# Patient Record
Sex: FEMALE | Race: WHITE | NOT HISPANIC OR LATINO | Employment: UNEMPLOYED | URBAN - NONMETROPOLITAN AREA
[De-identification: names, ages, dates, MRNs, and addresses within clinical notes are randomized per-mention and may not be internally consistent; named-entity substitution may affect disease eponyms.]

---
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
## 2024-06-28 ENCOUNTER — HOSPITAL ENCOUNTER (EMERGENCY)
Facility: HOSPITAL | Age: 19
Discharge: HOME | End: 2024-06-28
Attending: EMERGENCY MEDICINE | Admitting: EMERGENCY MEDICINE
Payer: COMMERCIAL

## 2024-06-28 VITALS
HEIGHT: 63 IN | WEIGHT: 103 LBS | OXYGEN SATURATION: 99 % | RESPIRATION RATE: 16 BRPM | HEART RATE: 87 BPM | TEMPERATURE: 97.8 F | SYSTOLIC BLOOD PRESSURE: 139 MMHG | BODY MASS INDEX: 18.25 KG/M2 | DIASTOLIC BLOOD PRESSURE: 84 MMHG

## 2024-06-28 DIAGNOSIS — R21 RASH AND NONSPECIFIC SKIN ERUPTION: Primary | ICD-10-CM

## 2024-06-28 PROCEDURE — 99282 EMERGENCY DEPT VISIT SF MDM: CPT

## 2024-06-28 PROCEDURE — 99284 EMERGENCY DEPT VISIT MOD MDM: CPT | Performed by: EMERGENCY MEDICINE

## 2024-06-28 RX ORDER — PREDNISONE 20 MG/1
20 TABLET ORAL DAILY
Qty: 5 TABLET | Refills: 0 | Status: SHIPPED | OUTPATIENT
Start: 2024-06-28 | End: 2024-06-29

## 2024-06-28 RX ORDER — PREDNISONE 20 MG/1
20 TABLET ORAL ONCE
Status: COMPLETED | OUTPATIENT
Start: 2024-06-28 | End: 2024-06-28

## 2024-06-28 RX ADMIN — PREDNISONE 20 MG: 20 TABLET ORAL at 21:46

## 2024-06-28 NOTE — Clinical Note
Misti Vogel was seen and treated in our emergency department on 6/28/2024.                Diagnosis:     Misti  may return to work on return date.    She may return on this date: 06/30/2024         If you have any questions or concerns, please don't hesitate to call.      Reji Roberts MD    ______________________________           _______________          _______________  Hospital Representative                              Date                                Time

## 2024-06-29 RX ORDER — PREDNISONE 20 MG/1
20 TABLET ORAL DAILY
Qty: 5 TABLET | Refills: 0 | Status: SHIPPED | OUTPATIENT
Start: 2024-06-29 | End: 2024-07-04

## 2024-06-29 NOTE — ED PROVIDER NOTES
History  Chief Complaint   Patient presents with    Rash     Pt has had a rash for the last three days. States the rash is on her b/l legs and feet and b/l hands. States it burns and itches and gets worse at night.        History provided by:  Medical records and patient  Rash  Location:  Leg  Leg rash location:  L knee, R knee, L foot and R foot  Quality: itchiness and redness    Severity:  Mild  Onset quality:  Gradual  Duration:  3 days  Timing:  Intermittent  Progression:  Waxing and waning  Chronicity:  New  Context comment:  Patient has developed an itchy rash to the bilateral lower extremities.  No new exposures.  Relieved by:  Antihistamines (Patient states she takes been Benadryl, she goes to sleep, when she awakes the rash is gone)  Worsened by:  Nothing  Associated symptoms: nausea and vomiting    Associated symptoms: no abdominal pain, no diarrhea, no fatigue, no fever, no headaches, no hoarse voice, no induration, no joint pain, no myalgias, no periorbital edema, no shortness of breath, no sore throat, no throat swelling, no tongue swelling, no URI and not wheezing        Prior to Admission Medications   Prescriptions Last Dose Informant Patient Reported? Taking?   busPIRone (BUSPAR) 15 mg tablet   Yes No   Sig: Take 1 tablet by mouth 2 (two) times a day   citalopram (CeleXA) 40 mg tablet   Yes No   Sig: Take 40 mg by mouth daily   omeprazole (PriLOSEC) 40 MG capsule   Yes No   Sig: Take 40 mg by mouth daily      Facility-Administered Medications: None       Past Medical History:   Diagnosis Date    Anxiety        Past Surgical History:   Procedure Laterality Date    CHOLECYSTECTOMY      TONSILLECTOMY         History reviewed. No pertinent family history.  I have reviewed and agree with the history as documented.    E-Cigarette/Vaping    E-Cigarette Use Never User      E-Cigarette/Vaping Substances     Social History     Tobacco Use    Smoking status: Never    Smokeless tobacco: Never   Vaping Use     Vaping status: Never Used   Substance Use Topics    Alcohol use: Never    Drug use: Never       Review of Systems   Constitutional:  Negative for chills, fatigue and fever.   HENT:  Negative for ear discharge, ear pain, hoarse voice, rhinorrhea and sore throat.    Eyes:  Negative for pain and visual disturbance.   Respiratory:  Negative for cough, shortness of breath and wheezing.    Cardiovascular:  Negative for chest pain and palpitations.   Gastrointestinal:  Positive for nausea and vomiting. Negative for abdominal pain and diarrhea.        The patient states her boyfriend has a GI bug, she has similar symptoms, slowly improving.  The patient also states she does medical marijuana.   Endocrine: Negative for polydipsia, polyphagia and polyuria.   Genitourinary:  Negative for difficulty urinating, dysuria, flank pain and hematuria.   Musculoskeletal:  Negative for arthralgias, back pain and myalgias.   Skin:  Positive for rash. Negative for color change.   Allergic/Immunologic: Negative for immunocompromised state.   Neurological:  Negative for dizziness, seizures, syncope, weakness and headaches.   Psychiatric/Behavioral:  Negative for confusion and self-injury. The patient is not nervous/anxious.    All other systems reviewed and are negative.      Physical Exam  Physical Exam  Vitals and nursing note reviewed.   Constitutional:       General: She is not in acute distress.     Appearance: Normal appearance. She is not ill-appearing, toxic-appearing or diaphoretic.   HENT:      Head: Normocephalic and atraumatic.      Nose: Nose normal. No congestion or rhinorrhea.      Mouth/Throat:      Mouth: Mucous membranes are moist.      Pharynx: Oropharynx is clear. No oropharyngeal exudate or posterior oropharyngeal erythema.   Eyes:      General:         Right eye: No discharge.         Left eye: No discharge.   Cardiovascular:      Rate and Rhythm: Normal rate and regular rhythm.      Pulses: Normal pulses.      Heart  sounds: Normal heart sounds. No murmur heard.     No gallop.   Pulmonary:      Effort: Pulmonary effort is normal. No respiratory distress.      Breath sounds: Normal breath sounds. No stridor. No wheezing, rhonchi or rales.   Chest:      Chest wall: No tenderness.   Abdominal:      General: Bowel sounds are normal. There is no distension.      Palpations: Abdomen is soft. There is no mass.      Tenderness: There is no abdominal tenderness. There is no right CVA tenderness, left CVA tenderness, guarding or rebound.      Hernia: No hernia is present.   Musculoskeletal:         General: Normal range of motion.      Cervical back: Normal range of motion and neck supple.   Skin:     General: Skin is warm and dry.      Capillary Refill: Capillary refill takes less than 2 seconds.      Findings: Rash present.      Comments: Nonspecific mild papular rash to the dorsum of both feet, bilateral patellar region   Neurological:      General: No focal deficit present.      Mental Status: She is alert and oriented to person, place, and time.      Cranial Nerves: No cranial nerve deficit.      Sensory: No sensory deficit.      Motor: No weakness.      Coordination: Coordination normal.      Gait: Gait normal.      Deep Tendon Reflexes: Reflexes normal.   Psychiatric:         Mood and Affect: Mood normal.         Behavior: Behavior normal.         Thought Content: Thought content normal.         Judgment: Judgment normal.         Vital Signs  ED Triage Vitals [06/28/24 2134]   Temperature Pulse Respirations Blood Pressure SpO2   97.8 °F (36.6 °C) 87 16 139/84 99 %      Temp Source Heart Rate Source Patient Position - Orthostatic VS BP Location FiO2 (%)   Temporal Monitor Sitting Right arm --      Pain Score       8           Vitals:    06/28/24 2134   BP: 139/84   Pulse: 87   Patient Position - Orthostatic VS: Sitting         Visual Acuity      ED Medications  Medications   predniSONE tablet 20 mg (20 mg Oral Given 6/28/24 2146)  "      Diagnostic Studies  Results Reviewed       None                   No orders to display              Procedures  Procedures         ED Course         CRAFFT      Flowsheet Row Most Recent Value   CRAFFT Initial Screen: During the past 12 months, did you:    1. Drink any alcohol (more than a few sips)?  No Filed at: 06/28/2024 2134   2. Smoke any marijuana or hashish No Filed at: 06/28/2024 2134   3. Use anything else to get high? (\"anything else\" includes illegal drugs, over the counter and prescription drugs, and things that you sniff or 'conley')? No Filed at: 06/28/2024 2134                                            Medical Decision Making  2130: Patient appears well, vital signs reviewed.  Patient has nonspecific papular rash to the dorsum of bilateral feet and the knees.  Patient shows a picture of some hives previously in these regions, that is not present at this time.  No oral or ocular lesions.  Negative Nikolsky sign.  No systemic symptoms.  Patient gets relief with Benadryl.  Plan to trial 5-day course of prednisone.             Disposition  Final diagnoses:   Rash and nonspecific skin eruption     Time reflects when diagnosis was documented in both MDM as applicable and the Disposition within this note       Time User Action Codes Description Comment    6/28/2024  9:42 PM Reji Roberts Add [R21] Rash and nonspecific skin eruption           ED Disposition       ED Disposition   Discharge    Condition   Stable    Date/Time   Fri Jun 28, 2024 2142    Comment   Misti Vogel discharge to home/self care.                   Follow-up Information       Follow up With Specialties Details Why Contact Info    Trever Limon, DO Internal Medicine Schedule an appointment as soon as possible for a visit   [address] [place] [address]  [phone]              Patient's Medications   Discharge Prescriptions    PREDNISONE 20 MG TABLET    Take 1 tablet (20 mg total) by mouth daily for 5 days       Start " Date: 6/28/2024 End Date: 7/3/2024       Order Dose: 20 mg       Quantity: 5 tablet    Refills: 0       No discharge procedures on file.    PDMP Review       None            ED Provider  Electronically Signed by             Reji Roberts MD  06/28/24 3539